# Patient Record
Sex: FEMALE | Race: WHITE
[De-identification: names, ages, dates, MRNs, and addresses within clinical notes are randomized per-mention and may not be internally consistent; named-entity substitution may affect disease eponyms.]

---

## 2021-12-17 ENCOUNTER — HOSPITAL ENCOUNTER (OUTPATIENT)
Dept: HOSPITAL 61 - ENDOS | Age: 66
Discharge: HOME | End: 2021-12-17
Attending: INTERNAL MEDICINE
Payer: COMMERCIAL

## 2021-12-17 VITALS — SYSTOLIC BLOOD PRESSURE: 131 MMHG | DIASTOLIC BLOOD PRESSURE: 64 MMHG

## 2021-12-17 VITALS — WEIGHT: 213.85 LBS | BODY MASS INDEX: 39.35 KG/M2 | HEIGHT: 62 IN

## 2021-12-17 VITALS — DIASTOLIC BLOOD PRESSURE: 74 MMHG | SYSTOLIC BLOOD PRESSURE: 151 MMHG

## 2021-12-17 DIAGNOSIS — Z83.3: ICD-10-CM

## 2021-12-17 DIAGNOSIS — K63.89: ICD-10-CM

## 2021-12-17 DIAGNOSIS — K64.0: ICD-10-CM

## 2021-12-17 DIAGNOSIS — Z82.49: ICD-10-CM

## 2021-12-17 DIAGNOSIS — Z79.84: ICD-10-CM

## 2021-12-17 DIAGNOSIS — I10: ICD-10-CM

## 2021-12-17 DIAGNOSIS — Z79.899: ICD-10-CM

## 2021-12-17 DIAGNOSIS — Z86.010: ICD-10-CM

## 2021-12-17 DIAGNOSIS — Z98.890: ICD-10-CM

## 2021-12-17 DIAGNOSIS — Z87.891: ICD-10-CM

## 2021-12-17 DIAGNOSIS — K62.1: ICD-10-CM

## 2021-12-17 DIAGNOSIS — Z80.0: ICD-10-CM

## 2021-12-17 DIAGNOSIS — E11.9: ICD-10-CM

## 2021-12-17 DIAGNOSIS — E03.9: ICD-10-CM

## 2021-12-17 DIAGNOSIS — G47.30: ICD-10-CM

## 2021-12-17 DIAGNOSIS — K57.30: ICD-10-CM

## 2021-12-17 DIAGNOSIS — Z12.11: Primary | ICD-10-CM

## 2021-12-17 PROCEDURE — 45380 COLONOSCOPY AND BIOPSY: CPT

## 2021-12-17 NOTE — CONS
DATE OF CONSULTATION: 2021

GASTROENTEROLOGY CONSULTATION



REFERRING PHYSICIAN:  Demetri Lynn MD



REASON FOR CONSULTATION:  Personal history of colon polyps, family history of 

colon cancer.



HISTORY OF PRESENT ILLNESS:  A 66-year-old female whose past medical history is 

significant for hypertension, diabetes, hypothyroidism, hyperlipidemia, seen for

interval colon exam.  The patient has had known colon polyps in the past.  

Family history is positive for colon cancer with her father, but no melena, 

hematochezia, diarrhea or constipation.  Weight and appetite are stable.  She is

otherwise without additional complaints.



PAST MEDICAL HISTORY:  Hypertension, diabetes, hypothyroidism and 

hyperlipidemia.



ALLERGIES:  None.



MEDICATIONS:  Include amlodipine, Coreg, insulin, levothyroxine, losartan, 

hydrochlorothiazide, metformin, Crestor, and Ozempic.



FAMILY HISTORY:  Significant for colon cancer, diabetes and hypertension with 

her father.



SOCIAL HISTORY:  Nonsmoker and nondrinker.



PAST SURGICAL HISTORY:  , cholecystectomy.



REVIEW OF SYSTEMS:  Per records.



PHYSICAL EXAMINATION:

GENERAL:  Reveals a well-nourished, well-developed female who is alert and 

cooperative, no acute distress.

VITAL SIGNS:  Temperature 98, pulse 82, respiratory rate 20.

LUNGS:  Clear.

CARDIOVASCULAR:  Reveals an S1, S2, without S3, S4 or appreciable murmur.

ABDOMEN:  Reveals a soft abdomen, normal bowel sounds without appreciable 

hepatosplenomegaly.



IMPRESSION:  Colon polyps with a family history of colon cancer.  Surveillance 

exam is recommended at this time.  Risks and benefits of procedure including 

risk of hemorrhage and perforation with operation were discussed.  The patient 

is willing to proceed at this time.



I thank Dr. Lynn for allowing us to consult and participate in this patient's 

care.







SSP/AVR

DR: SSP/nts   DD: 2021 07:42

DT: 2021 07:51   TID: [id number]

CC:     DEMETRI LYNN MD

## 2021-12-21 NOTE — PATHOLOGY
[facility] Accession Number: [id number]

.                                                                01

Material submitted:                                        .

rectum - RECTUM BX POLYP

.                                                                01

Clinical history:                                          .

CRC SCREENING

COLONOSCOPY

.                                                                02

**********************************************************************

Diagnosis:

Colorectal biopsies, rectal polyps:

- Hyperplastic polyps.

([facility]:pit; 12/21/2021)

[facility]  12/21/2021  0948 Local

**********************************************************************

.                                                                02

Comment:

There are no adenomatous changes or evidence of malignancy.

([facility]:pit; 12/21/2021)

.                                                                02

Electronically signed:                                     .

Fabian Franklin MD, Pathologist

NPI- [id number]

.                                                                01

Gross description:                                         .

The specimen is received in formalin, labeled "Ghada Farah, rectum polyp

biopsy".  Received are two segments of pale tan tissue measuring 0.4 and

0.6 cm in maximum dimensions.  The specimen is submitted entirely in

cassette A1.

([facility]; 12/20/2021)

[facility]  12/20/2021  1022 Local

.                                                                02

Pathologist provided ICD-10:

K62.1

.                                                                02

CPT                                                        .

677861

Specimen Comment: A courtesy copy of this report has been sent to [phone], [phone]

Specimen Comment: [id number]

Specimen Comment: Report sent to  / DR ADEN

***Performed at:  01

   [facility]

   [address], [place], [place]  [id number]

   MD Reno Johnson MD Phone:  [phone]

***Performed at:  02

   [facility]

   [address], [place]  [id number]

   MD Fabian Franklin MD Phone:  [phone]